# Patient Record
Sex: MALE | Race: WHITE | URBAN - METROPOLITAN AREA
[De-identification: names, ages, dates, MRNs, and addresses within clinical notes are randomized per-mention and may not be internally consistent; named-entity substitution may affect disease eponyms.]

---

## 2019-08-23 ENCOUNTER — OFFICE VISIT (OUTPATIENT)
Dept: URGENT CARE | Facility: URGENT CARE | Age: 29
End: 2019-08-23
Payer: COMMERCIAL

## 2019-08-23 VITALS
WEIGHT: 200 LBS | HEART RATE: 70 BPM | BODY MASS INDEX: 28 KG/M2 | SYSTOLIC BLOOD PRESSURE: 110 MMHG | HEIGHT: 71 IN | DIASTOLIC BLOOD PRESSURE: 80 MMHG

## 2019-08-23 DIAGNOSIS — L03.113 CELLULITIS OF RIGHT UPPER EXTREMITY: Primary | ICD-10-CM

## 2019-08-23 PROCEDURE — 99203 OFFICE O/P NEW LOW 30 MIN: CPT | Performed by: FAMILY MEDICINE

## 2019-08-23 ASSESSMENT — MIFFLIN-ST. JEOR: SCORE: 1884.32

## 2019-08-23 NOTE — PROGRESS NOTES
Subjective: 2 days ago he noticed that his right elbow was sore and a little red.  2 weeks ago there had been a little scratch there.  No injury.  He works as a cook.  It got a lot worse yesterday.  It is warm and tender    Objective: Right elbow is mildly red, but distal to the elbow is larger area of redness, may be 4 or 5 cm in diameter, tender.  The olecranon is mildly swollen but no significant collection of fluid.  There is no fluctuant area.  He has some pain with movement    Assessment and plan: Primarily a cellulitis in the right axillary area, causes uncertain.  Will start Augmentin today but if it is not better tomorrow he should be seen again.